# Patient Record
Sex: FEMALE | Race: WHITE | Employment: PART TIME | URBAN - METROPOLITAN AREA
[De-identification: names, ages, dates, MRNs, and addresses within clinical notes are randomized per-mention and may not be internally consistent; named-entity substitution may affect disease eponyms.]

---

## 2020-08-03 NOTE — PROGRESS NOTES
SUBJECTIVE:                                                   Kimmy Burrell is a 35 year old female who presents to clinic today for the following health issue(s):  Patient presents with:  Consult: NP Infertility consult          HPI:  Kimmy is a 34 yo G0 with a history of primary infertility who has been seen for several years at [facility]. She has used clomid for ovulation induction in the past and has actually had ovulation on Letrozole. Her work up is significant for subclinical hypothyroidism with PCOS. She had endometrial polyps that were removed in 2018. She had normal parent tubes in 2017. Her  completed a semen analysis that was normal in 2014. She is not currently on metformin or synthroid  Although she has been on them in the past. She is oligomenorrheic and has currently not had any menses for 5 months. She states that she tries to watch what she eats but is unable to lose weight. She exercises 2 times per week at this time. She has never tried intrauterine insemination and is ready to proceed with that as the next step.     Patient's last menstrual period was 03/02/2020..     Patient is sexually active, G0  Using none for contraception.    reports that she has never smoked. She has never used smokeless tobacco.    STD testing offered?  Declined    Health maintenance updated:  yes    Today's PHQ-2 Score: No flowsheet data found.  Today's PHQ-9 Score: No flowsheet data found.  Today's VERENICE-7 Score: No flowsheet data found.    Problem list and histories reviewed & adjusted, as indicated.  Additional history: as documented.    There is no problem list on file for this patient.    Past Surgical History:   Procedure Laterality Date     OPERATIVE HYSTEROSCOPY        Social History     Tobacco Use     Smoking status: Never Smoker     Smokeless tobacco: Never Used   Substance Use Topics     Alcohol use: Not Currently     Frequency: Never      Problem (# of Occurrences) Relation (Name,Age of  "Onset)    Diabetes (2) Sister, Brother    Hypertension (1) Father            Current Outpatient Medications   Medication Sig     letrozole (FEMARA) 2.5 MG tablet Take 2 tablets (5 mg) by mouth daily for 5 days     No current facility-administered medications for this visit.      Allergies not on file    ROS:  12 point review of systems negative other than symptoms noted below or in the HPI.  No urinary frequency or dysuria, bladder or kidney problems      OBJECTIVE:     /62   Pulse 68   Ht 1.594 m (5' 2.75\")   Wt 93.4 kg (206 lb)   LMP 03/02/2020   BMI 36.78 kg/m    Body mass index is 36.78 kg/m .    Exam:  Constitutional:  Appearance: Well nourished, well developed alert, in no acute distress  Skin: General Inspection:  No rashes present, no lesions present, no areas of discoloration.  Neurologic:  Mental Status:  Oriented X3.  Normal strength and tone, sensory exam grossly normal, mentation intact and speech normal.    Psychiatric:  Mentation appears normal and affect normal/bright.     In-Clinic Test Results:  Results for orders placed or performed in visit on 08/04/20 (from the past 24 hour(s))   Hemoglobin A1c   Result Value Ref Range    Hemoglobin A1C 5.9 (H) 0 - 5.6 %   HCG qualitative, Blood ([id number])   Result Value Ref Range    HCG Qualitative Serum Negative NEG^Negative       ASSESSMENT/PLAN:                                                        ICD-10-CM    1. Female infertility  N97.9 Anti-Mullerian hormone     TSH     T3, Free     T4 free     Hemoglobin A1c     Vitamin D Deficiency     HCG qualitative, Blood ([id number])     letrozole (FEMARA) 2.5 MG tablet     US Transvaginal Non OB   2. Subclinical hypothyroidism  E03.9    3. PCOS (polycystic ovarian syndrome)  E28.2 letrozole (FEMARA) 2.5 MG tablet     Kimmy has essentially completed an infertility work up previously and her main issues are anovulation and likely insulin resistance. I recommend we recheck her TSH and hemoglobin A1C in addition " to another AMH since it has been over 6 months.  I also recommend that we proceed with ovulation induction with Letrozole and plan for intrauterine insemination.  I advised her to increase her cardiovascular exercise to 5 times a week.  I encouraged CoQ10 supplementation for her  and vitamin D3, omega 3 FA and Folic acid for her.  The timing of the follicle study was explained. Will plan on getting this at the end of next week or the following week and plan the timing of IUI from there.    Callie Herrera MD  [facility] [place]

## 2020-08-04 ENCOUNTER — OFFICE VISIT (OUTPATIENT)
Dept: OBGYN | Facility: CLINIC | Age: 35
End: 2020-08-04
Payer: COMMERCIAL

## 2020-08-04 VITALS
HEART RATE: 68 BPM | HEIGHT: 63 IN | SYSTOLIC BLOOD PRESSURE: 118 MMHG | DIASTOLIC BLOOD PRESSURE: 62 MMHG | WEIGHT: 206 LBS | BODY MASS INDEX: 36.5 KG/M2

## 2020-08-04 DIAGNOSIS — N97.9 FEMALE INFERTILITY: Primary | ICD-10-CM

## 2020-08-04 DIAGNOSIS — E03.8 SUBCLINICAL HYPOTHYROIDISM: ICD-10-CM

## 2020-08-04 DIAGNOSIS — E28.2 PCOS (POLYCYSTIC OVARIAN SYNDROME): ICD-10-CM

## 2020-08-04 LAB
HBA1C MFR BLD: 5.9 % (ref 0–5.6)
HCG SERPL QL: NEGATIVE
T3FREE SERPL-MCNC: 2.8 PG/ML (ref 2.3–4.2)

## 2020-08-04 PROCEDURE — 84439 ASSAY OF FREE THYROXINE: CPT | Performed by: OBSTETRICS & GYNECOLOGY

## 2020-08-04 PROCEDURE — 99000 SPECIMEN HANDLING OFFICE-LAB: CPT | Performed by: OBSTETRICS & GYNECOLOGY

## 2020-08-04 PROCEDURE — 84481 FREE ASSAY (FT-3): CPT | Performed by: OBSTETRICS & GYNECOLOGY

## 2020-08-04 PROCEDURE — 83036 HEMOGLOBIN GLYCOSYLATED A1C: CPT | Performed by: OBSTETRICS & GYNECOLOGY

## 2020-08-04 PROCEDURE — 36415 COLL VENOUS BLD VENIPUNCTURE: CPT | Performed by: OBSTETRICS & GYNECOLOGY

## 2020-08-04 PROCEDURE — 99203 OFFICE O/P NEW LOW 30 MIN: CPT | Performed by: OBSTETRICS & GYNECOLOGY

## 2020-08-04 PROCEDURE — 84443 ASSAY THYROID STIM HORMONE: CPT | Performed by: OBSTETRICS & GYNECOLOGY

## 2020-08-04 PROCEDURE — 82306 VITAMIN D 25 HYDROXY: CPT | Performed by: OBSTETRICS & GYNECOLOGY

## 2020-08-04 PROCEDURE — 84703 CHORIONIC GONADOTROPIN ASSAY: CPT | Performed by: OBSTETRICS & GYNECOLOGY

## 2020-08-04 PROCEDURE — 83520 IMMUNOASSAY QUANT NOS NONAB: CPT | Mod: 90 | Performed by: OBSTETRICS & GYNECOLOGY

## 2020-08-04 RX ORDER — LETROZOLE 2.5 MG/1
5 TABLET, FILM COATED ORAL DAILY
Qty: 10 TABLET | Refills: 0 | Status: SHIPPED | OUTPATIENT
Start: 2020-08-04 | End: 2020-08-09

## 2020-08-04 SDOH — HEALTH STABILITY: MENTAL HEALTH: HOW OFTEN DO YOU HAVE A DRINK CONTAINING ALCOHOL?: NEVER

## 2020-08-04 ASSESSMENT — MIFFLIN-ST. JEOR: SCORE: 1599.57

## 2020-08-05 LAB
DEPRECATED CALCIDIOL+CALCIFEROL SERPL-MC: 26 UG/L (ref 20–75)
MIS SERPL-MCNC: 2.88 NG/ML (ref 0.18–11.71)
T4 FREE SERPL-MCNC: 0.9 NG/DL (ref 0.76–1.46)
TSH SERPL DL<=0.005 MIU/L-ACNC: 3.67 MU/L (ref 0.4–4)

## 2020-08-05 RX ORDER — METFORMIN HCL 500 MG
500 TABLET, EXTENDED RELEASE 24 HR ORAL
Qty: 30 TABLET | Refills: 11 | Status: SHIPPED | OUTPATIENT
Start: 2020-08-05

## 2020-08-05 RX ORDER — LETROZOLE 2.5 MG/1
2.5 TABLET, FILM COATED ORAL DAILY
Qty: 5 TABLET | Refills: 0 | Status: SHIPPED | OUTPATIENT
Start: 2020-08-05 | End: 2020-08-10

## 2020-08-14 ENCOUNTER — ANCILLARY PROCEDURE (OUTPATIENT)
Dept: ULTRASOUND IMAGING | Facility: CLINIC | Age: 35
End: 2020-08-14
Attending: OBSTETRICS & GYNECOLOGY
Payer: COMMERCIAL

## 2020-08-14 DIAGNOSIS — N97.9 FEMALE INFERTILITY: ICD-10-CM

## 2020-08-14 PROCEDURE — 76830 TRANSVAGINAL US NON-OB: CPT | Performed by: OBSTETRICS & GYNECOLOGY

## 2021-03-30 DIAGNOSIS — E28.2 PCOS (POLYCYSTIC OVARIAN SYNDROME): ICD-10-CM

## 2021-03-30 RX ORDER — LETROZOLE 2.5 MG/1
2.5 TABLET, FILM COATED ORAL DAILY
Qty: 5 TABLET | Refills: 0 | OUTPATIENT
Start: 2021-03-30

## 2021-03-30 NOTE — TELEPHONE ENCOUNTER
Requested Prescriptions   Pending Prescriptions Disp Refills     letrozole (FEMARA) 2.5 MG tablet 5 tablet 0     Sig: Take 1 tablet (2.5 mg) by mouth daily       There is no refill protocol information for this order        Last Written Prescription Date:  08/10/2020  Last Fill Quantity: 5,  # refills: 0   Last office visit: 8/4/2020 with prescribing provider:  Dr. Herrera   Future Office Visit:

## 2021-03-30 NOTE — TELEPHONE ENCOUNTER
Pt has not been seen since 8/4/20. Needs an appointment.    Hazel Porter RN on 3/30/2021 at 4:07 PM